# Patient Record
Sex: FEMALE | Race: WHITE | HISPANIC OR LATINO | URBAN - METROPOLITAN AREA
[De-identification: names, ages, dates, MRNs, and addresses within clinical notes are randomized per-mention and may not be internally consistent; named-entity substitution may affect disease eponyms.]

---

## 2022-09-14 ENCOUNTER — EMERGENCY (EMERGENCY)
Facility: HOSPITAL | Age: 22
LOS: 1 days | Discharge: HOME | End: 2022-09-14
Attending: EMERGENCY MEDICINE | Admitting: EMERGENCY MEDICINE
Payer: MEDICAID

## 2022-09-14 VITALS
OXYGEN SATURATION: 97 % | HEART RATE: 64 BPM | RESPIRATION RATE: 18 BRPM | SYSTOLIC BLOOD PRESSURE: 105 MMHG | DIASTOLIC BLOOD PRESSURE: 65 MMHG

## 2022-09-14 VITALS
TEMPERATURE: 98 F | RESPIRATION RATE: 18 BRPM | HEART RATE: 76 BPM | HEIGHT: 65 IN | DIASTOLIC BLOOD PRESSURE: 75 MMHG | OXYGEN SATURATION: 98 % | WEIGHT: 130.07 LBS | SYSTOLIC BLOOD PRESSURE: 119 MMHG

## 2022-09-14 LAB
ALBUMIN SERPL ELPH-MCNC: 4.1 G/DL — SIGNIFICANT CHANGE UP (ref 3.3–5)
ALP SERPL-CCNC: 61 U/L — SIGNIFICANT CHANGE UP (ref 40–120)
ALT FLD-CCNC: 19 U/L — SIGNIFICANT CHANGE UP (ref 10–45)
ANION GAP SERPL CALC-SCNC: 5 MMOL/L — SIGNIFICANT CHANGE UP (ref 5–17)
APPEARANCE UR: CLEAR — SIGNIFICANT CHANGE UP
AST SERPL-CCNC: 19 U/L — SIGNIFICANT CHANGE UP (ref 10–40)
BACTERIA # UR AUTO: ABNORMAL /HPF
BASOPHILS # BLD AUTO: 0.03 K/UL — SIGNIFICANT CHANGE UP (ref 0–0.2)
BASOPHILS NFR BLD AUTO: 0.6 % — SIGNIFICANT CHANGE UP (ref 0–2)
BILIRUB SERPL-MCNC: 0.4 MG/DL — SIGNIFICANT CHANGE UP (ref 0.2–1.2)
BILIRUB UR-MCNC: NEGATIVE — SIGNIFICANT CHANGE UP
BUN SERPL-MCNC: 9 MG/DL — SIGNIFICANT CHANGE UP (ref 7–23)
CALCIUM SERPL-MCNC: 9.1 MG/DL — SIGNIFICANT CHANGE UP (ref 8.4–10.5)
CHLORIDE SERPL-SCNC: 102 MMOL/L — SIGNIFICANT CHANGE UP (ref 96–108)
CO2 SERPL-SCNC: 32 MMOL/L — HIGH (ref 22–31)
COLOR SPEC: YELLOW — SIGNIFICANT CHANGE UP
CREAT SERPL-MCNC: 0.83 MG/DL — SIGNIFICANT CHANGE UP (ref 0.5–1.3)
DIFF PNL FLD: NEGATIVE — SIGNIFICANT CHANGE UP
EGFR: 103 ML/MIN/1.73M2 — SIGNIFICANT CHANGE UP
EOSINOPHIL # BLD AUTO: 0.07 K/UL — SIGNIFICANT CHANGE UP (ref 0–0.5)
EOSINOPHIL NFR BLD AUTO: 1.3 % — SIGNIFICANT CHANGE UP (ref 0–6)
EPI CELLS # UR: ABNORMAL
GLUCOSE SERPL-MCNC: 102 MG/DL — HIGH (ref 70–99)
GLUCOSE UR QL: NEGATIVE — SIGNIFICANT CHANGE UP
HCT VFR BLD CALC: 38.8 % — SIGNIFICANT CHANGE UP (ref 34.5–45)
HGB BLD-MCNC: 12.7 G/DL — SIGNIFICANT CHANGE UP (ref 11.5–15.5)
IMM GRANULOCYTES NFR BLD AUTO: 0.2 % — SIGNIFICANT CHANGE UP (ref 0–1.5)
KETONES UR-MCNC: NEGATIVE — SIGNIFICANT CHANGE UP
LEUKOCYTE ESTERASE UR-ACNC: ABNORMAL
LYMPHOCYTES # BLD AUTO: 2.41 K/UL — SIGNIFICANT CHANGE UP (ref 1–3.3)
LYMPHOCYTES # BLD AUTO: 46.4 % — HIGH (ref 13–44)
MCHC RBC-ENTMCNC: 29.7 PG — SIGNIFICANT CHANGE UP (ref 27–34)
MCHC RBC-ENTMCNC: 32.7 GM/DL — SIGNIFICANT CHANGE UP (ref 32–36)
MCV RBC AUTO: 90.7 FL — SIGNIFICANT CHANGE UP (ref 80–100)
MONOCYTES # BLD AUTO: 0.32 K/UL — SIGNIFICANT CHANGE UP (ref 0–0.9)
MONOCYTES NFR BLD AUTO: 6.2 % — SIGNIFICANT CHANGE UP (ref 2–14)
NEUTROPHILS # BLD AUTO: 2.35 K/UL — SIGNIFICANT CHANGE UP (ref 1.8–7.4)
NEUTROPHILS NFR BLD AUTO: 45.3 % — SIGNIFICANT CHANGE UP (ref 43–77)
NITRITE UR-MCNC: NEGATIVE — SIGNIFICANT CHANGE UP
NRBC # BLD: 0 /100 WBCS — SIGNIFICANT CHANGE UP (ref 0–0)
PH UR: 6.5 — SIGNIFICANT CHANGE UP (ref 5–8)
PLATELET # BLD AUTO: 269 K/UL — SIGNIFICANT CHANGE UP (ref 150–400)
POTASSIUM SERPL-MCNC: 3.8 MMOL/L — SIGNIFICANT CHANGE UP (ref 3.5–5.3)
POTASSIUM SERPL-SCNC: 3.8 MMOL/L — SIGNIFICANT CHANGE UP (ref 3.5–5.3)
PROT SERPL-MCNC: 7.7 G/DL — SIGNIFICANT CHANGE UP (ref 6–8.3)
PROT UR-MCNC: NEGATIVE — SIGNIFICANT CHANGE UP
RBC # BLD: 4.28 M/UL — SIGNIFICANT CHANGE UP (ref 3.8–5.2)
RBC # FLD: 12.4 % — SIGNIFICANT CHANGE UP (ref 10.3–14.5)
RBC CASTS # UR COMP ASSIST: SIGNIFICANT CHANGE UP /HPF (ref 0–4)
SODIUM SERPL-SCNC: 139 MMOL/L — SIGNIFICANT CHANGE UP (ref 135–145)
SP GR SPEC: 1.01 — SIGNIFICANT CHANGE UP (ref 1.01–1.02)
UROBILINOGEN FLD QL: NEGATIVE — SIGNIFICANT CHANGE UP
WBC # BLD: 5.19 K/UL — SIGNIFICANT CHANGE UP (ref 3.8–10.5)
WBC # FLD AUTO: 5.19 K/UL — SIGNIFICANT CHANGE UP (ref 3.8–10.5)
WBC UR QL: SIGNIFICANT CHANGE UP /HPF (ref 0–5)

## 2022-09-14 PROCEDURE — 80053 COMPREHEN METABOLIC PANEL: CPT

## 2022-09-14 PROCEDURE — 81001 URINALYSIS AUTO W/SCOPE: CPT

## 2022-09-14 PROCEDURE — 87086 URINE CULTURE/COLONY COUNT: CPT

## 2022-09-14 PROCEDURE — 99284 EMERGENCY DEPT VISIT MOD MDM: CPT | Mod: 25

## 2022-09-14 PROCEDURE — 96374 THER/PROPH/DIAG INJ IV PUSH: CPT

## 2022-09-14 PROCEDURE — 85025 COMPLETE CBC W/AUTO DIFF WBC: CPT

## 2022-09-14 PROCEDURE — 96361 HYDRATE IV INFUSION ADD-ON: CPT

## 2022-09-14 PROCEDURE — 99284 EMERGENCY DEPT VISIT MOD MDM: CPT

## 2022-09-14 PROCEDURE — 36415 COLL VENOUS BLD VENIPUNCTURE: CPT

## 2022-09-14 RX ORDER — ACETAMINOPHEN 500 MG
650 TABLET ORAL ONCE
Refills: 0 | Status: COMPLETED | OUTPATIENT
Start: 2022-09-14 | End: 2022-09-14

## 2022-09-14 RX ORDER — METOCLOPRAMIDE HCL 10 MG
10 TABLET ORAL ONCE
Refills: 0 | Status: COMPLETED | OUTPATIENT
Start: 2022-09-14 | End: 2022-09-14

## 2022-09-14 RX ORDER — METHOCARBAMOL 500 MG/1
500 TABLET, FILM COATED ORAL ONCE
Refills: 0 | Status: COMPLETED | OUTPATIENT
Start: 2022-09-14 | End: 2022-09-14

## 2022-09-14 RX ORDER — SODIUM CHLORIDE 9 MG/ML
1000 INJECTION INTRAMUSCULAR; INTRAVENOUS; SUBCUTANEOUS ONCE
Refills: 0 | Status: COMPLETED | OUTPATIENT
Start: 2022-09-14 | End: 2022-09-14

## 2022-09-14 RX ADMIN — Medication 10 MILLIGRAM(S): at 14:00

## 2022-09-14 RX ADMIN — METHOCARBAMOL 500 MILLIGRAM(S): 500 TABLET, FILM COATED ORAL at 13:41

## 2022-09-14 RX ADMIN — SODIUM CHLORIDE 1000 MILLILITER(S): 9 INJECTION INTRAMUSCULAR; INTRAVENOUS; SUBCUTANEOUS at 13:41

## 2022-09-14 RX ADMIN — Medication 650 MILLIGRAM(S): at 13:41

## 2022-09-14 NOTE — ED PROVIDER NOTE - PATIENT PORTAL LINK FT
Bladder scan at 365 ml. RN had patient attempt to void multiple times but was not able to void in urinal. Teresa care completed and straight cath preformed with assist of Ana Lilia ALLEN. 350 ml removed, urine red/brown in color with small clots.    You can access the FollowMyHealth Patient Portal offered by [facility] by registering at the following website: [email or web address]. By joining [facility]’s FollowMyHealth portal, you will also be able to view your health information using other applications (apps) compatible with our system.

## 2022-09-14 NOTE — ED ADULT NURSE NOTE - OBJECTIVE STATEMENT
pt reported headache and upper back pain, denies fever, chills cough, or N/V/D skin warm dry color pink. reports 10 lb weight loss over several months. Appetite good. Intermittent bruising.

## 2022-09-14 NOTE — ED PROVIDER NOTE - CLINICAL SUMMARY MEDICAL DECISION MAKING FREE TEXT BOX
23 y/o F with no reported PMH presents to the ED primarily for HA, upper back and neck pain x 15 days. She also c/o intermittent episode sof bruising since age 9, weight loss of 10 lbs x few months and upper back pain x 2 yrs, which worsened yesterday.  She denies trauma. She denies urinary sympts and abd pain despite triage note. Reports FHx of leukemia and cancers. PE as noted above. labs pending, IVF hydration, meds. reassess  pt does not have a PCP, Crystal will help schedule an appt 21 y/o F with no reported PMH presents to the ED primarily for HA, upper back and neck pain x 15 days. She also c/o intermittent episode sof bruising since age 9, weight loss of 10 lbs x few months and upper back pain x 2 yrs, which worsened yesterday.  She denies trauma. She denies urinary sympts and abd pain despite triage note. Reports FHx of leukemia and cancers. PE as noted above. labs pending, IVF hydration, meds. reassess  pt does not have a PCP, Crystal will help schedule an appt  No acute findings. Worsening, continued or ANY new concerning symptoms return to the emergency department.

## 2022-09-14 NOTE — ED PROVIDER NOTE - ATTENDING APP SHARED VISIT CONTRIBUTION OF CARE
Kailee with CHARLIE Hess. 23 y/o F with no reported PMH presents to the ED primarily for HA, upper back and neck pain x 15 days. She also c/o intermittent episodes of bruising since age 9, weight loss of 10 lbs x few months and upper back pain x 2 yrs, which worsened yesterday.  She denies trauma. She denies urinary sympts and abd pain despite triage note. Reports FHx of leukemia and cancers. PE as noted above. labs pending, IVF hydration, meds. reassess  pt does not have a PCP, Crystal will help schedule an appt  No acute findings. Worsening, continued or ANY new concerning symptoms return to the emergency department.    I performed a face to face bedside interview with patient regarding history of present illness, review of symptoms and past medical history. I completed an independent physical exam.  I have discussed the patient's plan of care with Physician Assistant (PA). I agree with note as stated above, having amended the EMR as needed to reflect my findings.   This includes History of Present Illness, HIV, Past Medical/Surgical/Family/Social History, Allergies and Home Medications, Review of Systems, Physical Exam, and any Progress Notes during the time I functioned as the attending physician for this patient.

## 2022-09-14 NOTE — ED PROVIDER NOTE - PHYSICAL EXAMINATION
Gen: Well appearing in NAD.  AAOx3  Eyes: PERRLA  Head: atraumatic  Heart: s1/s2, RRR  Lung: CTA b/l, no wheezing/rhonchi or rales.   Abd: soft, NT/ND, no rebound or guarding, NCVAT  Extremity: +reproducible para-cervical muscle TTP and trapezius muscle TTP.  no midline spinal TTP   Neuro: patient moving all extremity equally, no focal neuro deficits noted  Skin: Normal for race. No rashes    pt c/o right axilla lump. Breast exam chaperoned by SANDY Cortes: no palpable breast lumps or axillary lymphadenopathy. No nipple dc

## 2022-09-14 NOTE — ED PROVIDER NOTE - NSFOLLOWUPINSTRUCTIONS_ED_ALL_ED_FT
Migraña    LO QUE NECESITA SABER:    Mary migraña es un dolor de carl intenso. El dolor puede ser tan severo que interfiere con juliette actividades cotidianas. Mary migraña puede durar desde pocas horas hasta varios días. La causa exacta de la migraña no es conocida.    INSTRUCCIONES SOBRE EL [facility] HOSPITALARIA:    Llame al número local de emergencias (911 en los [place]) o pídale a alguien que llame si:  •Usted siente que se va a desmayar, se siente confundido o sufre mary convulsión.          Regrese a la kelli de emergencias si:  •Usted tiene dolor de carl que parece ser diferente o mucho peor que terrazas migraña habitual.      •Usted tiene un dolor de carl severo con fiebre o rigidez en el zeynep.      •Usted tiene nuevos problemas con el habla, la visión, el equilibrio o el movimiento.      Llame a terrazas médico o neurólogo si:  •Terrazas migraña interfiere con juliette actividades cotidianas.      •Juliette medicamentos o tratamientos alvino de funcionar.      •Usted tiene preguntas o inquietudes acerca de terrazas condición o cuidado.      Medicamentos:Algunos medicamentos pueden administrarse solamente mientras está en el servicio de urgencias. También es posible que más adelante necesite medicamentos para controlar las migrañas u otros problemas de minal que puedan causar. [place] el medicamento tan pronto pantera sienta que le comienza mary migraña, o según le hayan indicado.  •Medicamentos para la migrañase utilizan para ayudar a evitar o detener mary migraña.      •AINEpueden disminuir la inflamación y el dolor o la fiebre. Mara medicamento está disponible con o sin mary receta médica. Los CHERRY pueden causar sangrado estomacal o problemas renales en ciertas personas. Si usted juanjose un medicamento anticoagulante, siempre pregúntele a terrazas médico si los CHERRY son seguros para usted. Siempre pasha la etiqueta de mara medicamento y siga las instrucciones.      •Acetaminofénalivia el dolor y baja la fiebre. Está disponible sin receta médica. Pregunte la cantidad y la frecuencia con que debe tomarlos. Siga las indicaciones. Pasha las etiquetas de todos los demás medicamentos que esté usando para saber si también contienen acetaminofén, o pregunte a terrazas médico o farmacéutico. El acetaminofén puede causar daño en el hígado cuando no se juanjose de forma correcta.      •Puede administrarsepodrían administrarse. Pregunte al médico cómo debe shaan mara medicamento de forma oates. Algunos medicamentos recetados para el dolor contienen acetaminofén. No tome otros medicamentos que contengan acetaminofén sin consultarlo con terrazas médico. Demasiado acetaminofeno puede causar daño al hígado. Los medicamentos recetados para el dolor podrían causar estreñimiento. Pregunte a terrazas médico pantera prevenir o tratar estreñimiento.      •Los medicamentos contra las náuseaspueden darse para calmar terrazas estómago y ayudarle a prevenir los vómitos. Mara medicamento también puede aliviar el dolor.      •Los esteroidespueden administrarse para evitar que la migraña vuelva a aparecer de inmediato.      •[place] juliette medicamentos pantera se le haya indicado.Consulte con terrazas médico si usted zuleyma que terrazas medicamento no le está ayudando o si presenta efectos secundarios. Infórmele al médico si usted es alérgico a algún medicamento. Mantenga mary lista actualizada de los medicamentos, las vitaminas y los productos herbales que juanjose. Incluya los siguientes datos de los medicamentos: cantidad, frecuencia y motivo de administración. Traiga con usted la lista o los envases de las píldoras a juliette citas de seguimiento. Lleve la lista de los medicamentos con usted en luis fernando de mary emergencia.      El manejo de juliette síntomas:  •Repose en mary habitación oscura y tranquila.[place] ayudará a disminuir el dolor. El dormir también podría ayudarlo a aliviar terrazas dolor.      •Aplique hielo para reducir el dolor.Use mary compresa de hielo o ponga hielo triturado en mary bolsa de plástico. Cubra el paquete de hielo con mary toalla y colóqueselo en la carl. Aplique hielo michele 15 a 20 minutos cada hora.      •Aplique calor para disminuir el dolor y los espasmos musculares.Utilice mary toalla pequeña empapada con [place], mary almohada térmica o tome un baño de nico con agua tibia. Aplique la compresa caliente sobre el área por 20 a 30 minutos cada 2 horas. Usted puede alternar el calor y el hielo.      •Mantenga un registro de las migrañas.Escriba cuándo comienzan y terminan juliette migrañas. Incluya juliette síntomas y qué estaba haciendo cuando comenzó mary migraña. Registre lo que comió y lo que tomó las 24 horas antes de que comenzara terrazas migraña. Mantenga un registro de lo que hizo para tratar terrazas migraña y si funcionó. Traiga el registro de las migrañas con usted a las citas con terrazas médico.      Los factores desencadenantes comunes de la migraña incluyen los siguientes:  •El estrés, fatiga de los ojos, dormir demasiado o no dormir lo suficiente      •Cambios hormonales en las mujeres debido a las píldoras anticonceptivas, embarazo, menopausia o michele la menstruación      •Omitir comidas, pasar mucho tiempo sin comer o no shaan suficientes líquidos      •Ciertos alimentos o bebidas, pantera el chocolate, queso gurwinder, alcohol o bebidas que contienen cafeína      •Alimentos que contienen gluten, nitratos, glutamato monosódico o endulzantes artificiales      •Charis solar, luces brillantes o luces parpadeantes, ruidos tosin, humo u olores tosin      •Calor, humedad o cambios en el clima      Evite otra migraña:  •Evite el dolor de carl por uso excesivo de medicamentos.[place] los analgésicos solamente según le hayan indicado. Un medicamento puede estar limitado a mary cierta cantidad cada mes. El médico puede ayudarlo a crear un plan para que reciba mary cantidad oates cada mes.      •No fume.La nicotina y otras sustancias químicas en los cigarrillos y puros pueden desencadenar mary migraña o agravarla. Pida información a terrazas médico si usted actualmente fuma y necesita ayuda para dejar de fumar. Los cigarrillos electrónicos o el tabaco sin humo igualmente contienen nicotina. Consulte con terrazas médico antes de utilizar estos productos.      •No consuma alcohol.El alcohol puede provocar migraña. También puede impedir que tengan efecto los medicamentos para la migraña.      •Sea físicamente activo.La actividad física, pantera el ejercicio, puede ayudar a prevenir las migrañas. Consulte con terrazas médico acerca del mejor plan de actividad para usted. Trate de hacer al menos 30 minutos de actividad física la mayoría de los jailene.  Rogelio hispana caminando pantera ejercicio           •Controle el estrés.El estrés podría provocar migraña. Aprenda nuevas maneras de relajarse pantera la respiración profunda.      •Establezca un horario para dormir.Acuéstese y levántese a la misma hora cada día. No robel televisión inmediatamente antes de acostarse.      •Consuma alimentos saludables y variados.Incluya alimentos saludables pantera fruta, verduras, panes integrales, productos lácteos bajos en grasa, frijoles, carne magra y pescado. No consuma alimentos o bebidas que puedan desencadenar juliette migrañas.  Alimentos saludables           •[place] más líquidos para evitar la deshidratación.Terrazas médico le indicará cuánto líquido debería beber a diario y qué líquidos son los mejores para usted.      Acuda a juliette consultas de control con terrazas médico o neurólogo según le indicaron:Lleve terrazas registro de migrañas con usted. Anote juliette preguntas para que se acuerde de hacerlas michele juliette visitas.

## 2022-09-14 NOTE — ED PROVIDER NOTE - NS ED ATTENDING STATEMENT MOD
This was a shared visit with the JUAQUIN. I reviewed and verified the documentation and independently performed the documented:

## 2022-09-14 NOTE — ED PROVIDER NOTE - OBJECTIVE STATEMENT
# [id number]  23 y/o F with no reported PMH presents to the ED primarily for HA, upper back and neck pain x 15 days. She also c/o intermittent episode sof bruising since age 9, weight loss of 10 lbs x few months and upper back pain x 2 yrs, which worsened yesterday.  She denies trauma. She denies urinary sympts and abd pain despite triage note. Reports FHx of leukemia and cancers.

## 2022-09-15 PROBLEM — Z00.00 ENCOUNTER FOR PREVENTIVE HEALTH EXAMINATION: Status: ACTIVE | Noted: 2022-09-15

## 2022-09-16 LAB
CULTURE RESULTS: SIGNIFICANT CHANGE UP
SPECIMEN SOURCE: SIGNIFICANT CHANGE UP

## 2022-09-19 ENCOUNTER — OUTPATIENT (OUTPATIENT)
Dept: OUTPATIENT SERVICES | Facility: HOSPITAL | Age: 22
LOS: 1 days | End: 2022-09-19
Payer: SELF-PAY

## 2022-09-19 ENCOUNTER — RESULT REVIEW (OUTPATIENT)
Age: 22
End: 2022-09-19

## 2022-09-19 ENCOUNTER — RESULT CHARGE (OUTPATIENT)
Age: 22
End: 2022-09-19

## 2022-09-19 ENCOUNTER — APPOINTMENT (OUTPATIENT)
Dept: FAMILY MEDICINE | Facility: HOSPITAL | Age: 22
End: 2022-09-19

## 2022-09-19 VITALS
OXYGEN SATURATION: 98 % | HEART RATE: 74 BPM | BODY MASS INDEX: 23.19 KG/M2 | TEMPERATURE: 98.4 F | SYSTOLIC BLOOD PRESSURE: 115 MMHG | RESPIRATION RATE: 12 BRPM | WEIGHT: 126 LBS | HEIGHT: 62 IN | DIASTOLIC BLOOD PRESSURE: 69 MMHG

## 2022-09-19 DIAGNOSIS — Z00.00 ENCOUNTER FOR GENERAL ADULT MEDICAL EXAMINATION WITHOUT ABNORMAL FINDINGS: ICD-10-CM

## 2022-09-19 DIAGNOSIS — Z23 ENCOUNTER FOR IMMUNIZATION: ICD-10-CM

## 2022-09-19 PROBLEM — Z78.9 OTHER SPECIFIED HEALTH STATUS: Chronic | Status: ACTIVE | Noted: 2022-09-14

## 2022-09-19 PROCEDURE — G0463: CPT

## 2022-09-20 DIAGNOSIS — N92.0 EXCESSIVE AND FREQUENT MENSTRUATION WITH REGULAR CYCLE: ICD-10-CM

## 2022-09-20 PROBLEM — Z23 ENCOUNTER FOR IMMUNIZATION: Status: ACTIVE | Noted: 2022-09-19

## 2022-09-20 LAB
BILIRUB UR QL STRIP: NEGATIVE
CLARITY UR: CLEAR
COLLECTION METHOD: NORMAL
GLUCOSE UR-MCNC: NEGATIVE
HCG UR QL: 0.2 EU/DL
HCG UR QL: NEGATIVE
HGB UR QL STRIP.AUTO: NEGATIVE
KETONES UR-MCNC: NEGATIVE
LEUKOCYTE ESTERASE UR QL STRIP: NORMAL
NITRITE UR QL STRIP: NEGATIVE
PH UR STRIP: 8
PROT UR STRIP-MCNC: NEGATIVE
QUALITY CONTROL: YES
SP GR UR STRIP: 1.01

## 2022-09-23 NOTE — HEALTH RISK ASSESSMENT
[Never] : Never [No] : No [0] : 2) Feeling down, depressed, or hopeless: Not at all (0) [With Family] : lives with family [Unemployed] : unemployed [College] : College [Single] : single [Sexually Active] : sexually active [Smoke Detector] : smoke detector [Reports changes in hearing] : Reports no changes in hearing [Reports changes in vision] : Reports no changes in vision

## 2022-09-23 NOTE — HISTORY OF PRESENT ILLNESS
[Post-hospitalization from ___ Hospital] : Post-hospitalization from [unfilled] Hospital [FreeTextEntry2] : 21 YO F with no past pertinent Medical history presents to establish care. Patient states that she has abdominal pain, and brought from ED, and was evaluated, and was discharged. She has had this for 2 months. LMP 6/13/22. Patient had a Depo Provera shot given in May 2022, administered in [facility] previous to coming to this country. Patient since then has had cyclic abdominal and pelvic pain, no menstrual bleeding, and has reported to the emergency department. She was given IV Fluids and NSAIDs. She has no current plans to become pregnant but states that she could be

## 2022-09-26 ENCOUNTER — RESULT REVIEW (OUTPATIENT)
Age: 22
End: 2022-09-26

## 2022-09-26 ENCOUNTER — OUTPATIENT (OUTPATIENT)
Dept: OUTPATIENT SERVICES | Facility: HOSPITAL | Age: 22
LOS: 1 days | End: 2022-09-26
Payer: SELF-PAY

## 2022-09-26 DIAGNOSIS — Z00.00 ENCOUNTER FOR GENERAL ADULT MEDICAL EXAMINATION WITHOUT ABNORMAL FINDINGS: ICD-10-CM

## 2022-09-26 PROCEDURE — 76856 US EXAM PELVIC COMPLETE: CPT

## 2022-09-26 PROCEDURE — 76830 TRANSVAGINAL US NON-OB: CPT | Mod: 26

## 2022-09-26 PROCEDURE — 76856 US EXAM PELVIC COMPLETE: CPT | Mod: 26

## 2022-09-26 PROCEDURE — 76830 TRANSVAGINAL US NON-OB: CPT

## 2022-10-29 ENCOUNTER — APPOINTMENT (OUTPATIENT)
Dept: FAMILY MEDICINE | Facility: HOSPITAL | Age: 22
End: 2022-10-29

## 2022-10-29 ENCOUNTER — OUTPATIENT (OUTPATIENT)
Dept: OUTPATIENT SERVICES | Facility: HOSPITAL | Age: 22
LOS: 1 days | End: 2022-10-29
Payer: SELF-PAY

## 2022-10-29 VITALS
BODY MASS INDEX: 23.55 KG/M2 | TEMPERATURE: 97.3 F | OXYGEN SATURATION: 98 % | HEART RATE: 73 BPM | HEIGHT: 62 IN | SYSTOLIC BLOOD PRESSURE: 109 MMHG | RESPIRATION RATE: 14 BRPM | WEIGHT: 128 LBS | DIASTOLIC BLOOD PRESSURE: 66 MMHG

## 2022-10-29 DIAGNOSIS — N92.0 EXCESSIVE AND FREQUENT MENSTRUATION WITH REGULAR CYCLE: ICD-10-CM

## 2022-10-29 DIAGNOSIS — Z00.00 ENCOUNTER FOR GENERAL ADULT MEDICAL EXAMINATION WITHOUT ABNORMAL FINDINGS: ICD-10-CM

## 2022-10-29 PROCEDURE — G0463: CPT

## 2022-10-29 RX ORDER — LEVONORGESTREL AND ETHINYL ESTRADIOL 0.15-0.03
0.15-3 KIT ORAL DAILY
Qty: 3 | Refills: 3 | Status: ACTIVE | COMMUNITY
Start: 2022-10-29 | End: 1900-01-01

## 2022-10-31 DIAGNOSIS — N92.0 EXCESSIVE AND FREQUENT MENSTRUATION WITH REGULAR CYCLE: ICD-10-CM

## 2022-12-04 NOTE — PHYSICAL EXAM
[Normal Appearance] : normal in appearance [No Masses] : no palpable masses [No Nipple Discharge] : no nipple discharge [No Axillary Lymphadenopathy] : no axillary lymphadenopathy [Normal] : no joint swelling and grossly normal strength and tone [de-identified] : Chaperone - Melissa Mays

## 2022-12-04 NOTE — HISTORY OF PRESENT ILLNESS
[de-identified] : 23 YO F presents for a follow up of irregular menstrual period. Patient was given Depo Provera prior to immigrating to the [place]. Patient happy that her LMP occurred on 10/5/22. She states that it was assciated with heavy bleeding and pelvic pain during menstration, as well as right sided breast tenderness, with no masses. Jarretttent is sexually active with one male person and uses condoms for contraception. Patient denies any fever, chills, chest pain, shortness of breath, nausea, vomiting, headache, cough, leg pain dizziness or weakness. \par \par  \par

## 2022-12-04 NOTE — ASSESSMENT
[FreeTextEntry1] : Patient to return to clinic in 1 month for follow up of breast tenderness and pelvic pain\par

## 2022-12-12 ENCOUNTER — APPOINTMENT (OUTPATIENT)
Dept: FAMILY MEDICINE | Facility: HOSPITAL | Age: 22
End: 2022-12-12

## 2022-12-12 DIAGNOSIS — N64.4 MASTODYNIA: ICD-10-CM

## 2022-12-12 DIAGNOSIS — R10.2 PELVIC AND PERINEAL PAIN: ICD-10-CM

## 2023-06-29 ENCOUNTER — RESULT CHARGE (OUTPATIENT)
Age: 23
End: 2023-06-29

## 2023-06-29 ENCOUNTER — APPOINTMENT (OUTPATIENT)
Dept: FAMILY MEDICINE | Facility: HOSPITAL | Age: 23
End: 2023-06-29

## 2023-06-29 ENCOUNTER — OUTPATIENT (OUTPATIENT)
Dept: OUTPATIENT SERVICES | Facility: HOSPITAL | Age: 23
LOS: 1 days | End: 2023-06-29
Payer: SELF-PAY

## 2023-06-29 VITALS
DIASTOLIC BLOOD PRESSURE: 72 MMHG | TEMPERATURE: 98.1 F | HEART RATE: 82 BPM | OXYGEN SATURATION: 99 % | SYSTOLIC BLOOD PRESSURE: 116 MMHG | HEIGHT: 62 IN | RESPIRATION RATE: 14 BRPM

## 2023-06-29 DIAGNOSIS — Z00.00 ENCOUNTER FOR GENERAL ADULT MEDICAL EXAMINATION WITHOUT ABNORMAL FINDINGS: ICD-10-CM

## 2023-06-29 PROCEDURE — 87186 SC STD MICRODIL/AGAR DIL: CPT

## 2023-06-29 PROCEDURE — 87086 URINE CULTURE/COLONY COUNT: CPT

## 2023-06-29 PROCEDURE — G0463: CPT

## 2023-06-29 PROCEDURE — 87077 CULTURE AEROBIC IDENTIFY: CPT

## 2023-06-29 PROCEDURE — 87255 GENET VIRUS ISOLATE HSV: CPT

## 2023-06-29 PROCEDURE — 87800 DETECT AGNT MULT DNA DIREC: CPT

## 2023-06-29 PROCEDURE — 87070 CULTURE OTHR SPECIMN AEROBIC: CPT

## 2023-06-29 PROCEDURE — 36415 COLL VENOUS BLD VENIPUNCTURE: CPT

## 2023-06-29 RX ORDER — VALACYCLOVIR 500 MG/1
500 TABLET, FILM COATED ORAL TWICE DAILY
Qty: 14 | Refills: 0 | Status: ACTIVE | COMMUNITY
Start: 2023-06-29 | End: 1900-01-01

## 2023-06-30 DIAGNOSIS — R21 RASH AND OTHER NONSPECIFIC SKIN ERUPTION: ICD-10-CM

## 2023-07-01 LAB
BACTERIA UR CULT: NORMAL
BILIRUB UR QL STRIP: NORMAL
C TRACH RRNA SPEC QL NAA+PROBE: NOT DETECTED
CANDIDA VAG CYTO: NOT DETECTED
CLARITY UR: CLEAR
COLLECTION METHOD: NORMAL
G VAGINALIS+PREV SP MTYP VAG QL MICRO: DETECTED
GLUCOSE UR-MCNC: NORMAL
HCG UR QL: 0.2 EU/DL
HGB UR QL STRIP.AUTO: NORMAL
KETONES UR-MCNC: NORMAL
LEUKOCYTE ESTERASE UR QL STRIP: NORMAL
N GONORRHOEA RRNA SPEC QL NAA+PROBE: NOT DETECTED
NITRITE UR QL STRIP: NORMAL
PH UR STRIP: >=9
PROT UR STRIP-MCNC: NORMAL
SOURCE AMPLIFICATION: NORMAL
SP GR UR STRIP: 1.01
T VAGINALIS VAG QL WET PREP: NOT DETECTED

## 2023-07-01 RX ORDER — METRONIDAZOLE 7.5 MG/G
0.75 GEL VAGINAL
Qty: 1 | Refills: 0 | Status: ACTIVE | COMMUNITY
Start: 2023-07-01 | End: 1900-01-01

## 2023-07-03 LAB
HHV SPEC CULT: NORMAL
HSV TYPE 1: NORMAL
HSV TYPE 2: NORMAL

## 2023-07-04 ENCOUNTER — NON-APPOINTMENT (OUTPATIENT)
Age: 23
End: 2023-07-04

## 2023-07-04 NOTE — PHYSICAL EXAM
[Normal] : soft, non-tender, non-distended, no masses palpated, no HSM and normal bowel sounds [de-identified] : diffuse erythema on genitals and surrounding area with satellite lesions to groin and pubic symphysis.

## 2023-07-04 NOTE — HISTORY OF PRESENT ILLNESS
[FreeTextEntry8] : 21 YO F who presents for concern of genital  rash x1week. Rash is painful and itchy. States irritation when urinating. No previous episodes. Is sexually active and uses condoms.\par \par

## 2023-07-04 NOTE — REVIEW OF SYSTEMS
[Dysuria] : dysuria [Negative] : Eyes [Fever] : no fever [Chills] : no chills [Chest Pain] : no chest pain [Palpitations] : no palpitations [Shortness Of Breath] : no shortness of breath [Wheezing] : no wheezing [Cough] : no cough [Abdominal Pain] : no abdominal pain [Nausea] : no nausea [Vomiting] : no vomiting [Hematuria] : no hematuria [Frequency] : no frequency [Headache] : no headache [Dizziness] : no dizziness [FreeTextEntry8] : pruritic rash

## 2023-07-06 ENCOUNTER — APPOINTMENT (OUTPATIENT)
Dept: FAMILY MEDICINE | Facility: HOSPITAL | Age: 23
End: 2023-07-06

## 2023-07-06 ENCOUNTER — OUTPATIENT (OUTPATIENT)
Dept: OUTPATIENT SERVICES | Facility: HOSPITAL | Age: 23
LOS: 1 days | End: 2023-07-06
Payer: COMMERCIAL

## 2023-07-06 VITALS
DIASTOLIC BLOOD PRESSURE: 61 MMHG | SYSTOLIC BLOOD PRESSURE: 106 MMHG | RESPIRATION RATE: 16 BRPM | OXYGEN SATURATION: 96 % | HEART RATE: 67 BPM | WEIGHT: 122 LBS | TEMPERATURE: 97.4 F

## 2023-07-06 DIAGNOSIS — N76.0 ACUTE VAGINITIS: ICD-10-CM

## 2023-07-06 DIAGNOSIS — R21 RASH AND OTHER NONSPECIFIC SKIN ERUPTION: ICD-10-CM

## 2023-07-06 DIAGNOSIS — B96.89 ACUTE VAGINITIS: ICD-10-CM

## 2023-07-06 DIAGNOSIS — Z00.00 ENCOUNTER FOR GENERAL ADULT MEDICAL EXAMINATION WITHOUT ABNORMAL FINDINGS: ICD-10-CM

## 2023-07-06 PROCEDURE — G0463: CPT

## 2023-07-06 RX ORDER — AMOXICILLIN 500 MG/1
500 TABLET, FILM COATED ORAL 3 TIMES DAILY
Qty: 15 | Refills: 0 | Status: ACTIVE | COMMUNITY
Start: 2023-07-06 | End: 1900-01-01

## 2023-07-06 RX ORDER — CLOTRIMAZOLE AND BETAMETHASONE DIPROPIONATE 10; .5 MG/G; MG/G
1-0.05 CREAM TOPICAL TWICE DAILY
Qty: 1 | Refills: 0 | Status: ACTIVE | COMMUNITY
Start: 2023-06-29 | End: 1900-01-01

## 2023-07-06 NOTE — REVIEW OF SYSTEMS
[Fever] : no fever [Chills] : no chills [Chest Pain] : no chest pain [Palpitations] : no palpitations [Shortness Of Breath] : no shortness of breath [Wheezing] : no wheezing [Abdominal Pain] : no abdominal pain [Nausea] : no nausea [Vomiting] : no vomiting [Dysuria] : no dysuria [Frequency] : no frequency [Vaginal Discharge] : no vaginal discharge [Dysmenorrhea] : no dysmenorrhea

## 2023-07-06 NOTE — PHYSICAL EXAM
[No Acute Distress] : no acute distress [Well Nourished] : well nourished [Normal Sclera/Conjunctiva] : normal sclera/conjunctiva [EOMI] : extraocular movements intact [No Respiratory Distress] : no respiratory distress  [No Accessory Muscle Use] : no accessory muscle use [Clear to Auscultation] : lungs were clear to auscultation bilaterally [Normal Rate] : normal rate  [Regular Rhythm] : with a regular rhythm [Normal S1, S2] : normal S1 and S2 [de-identified] : no longer erythematous genitalia; a few satellite lesions to groin and pubic symphysis

## 2023-07-06 NOTE — HISTORY OF PRESENT ILLNESS
[FreeTextEntry1] : f/u genital rash  [de-identified] : - Patient is a 24 y/o F presenting to clinic to follow-up on genital rash. \par - She was last seen on 6/29/23 for a genital rash that had been present for 1 week at that time. Was painful and itchy and she experienced stinging when urinating. \par - She was empirically given clotrimazole cream and 7 days of valacyclovir. BV panel and  Genital culture was also performed. \par - BV panel from 6/29/23 showed bacterial vaginosis. Patient was called and metronidazole gel was sent. \par - Today she reports significant improvement in the rash. No longer erythematous in her inner thighs. There are still a few scattered satellite lesions on her mons pubis.

## 2023-07-08 LAB — BACTERIA GENITAL AEROBE CULT: ABNORMAL

## 2023-07-11 DIAGNOSIS — R21 RASH AND OTHER NONSPECIFIC SKIN ERUPTION: ICD-10-CM

## 2023-07-30 ENCOUNTER — EMERGENCY (EMERGENCY)
Facility: HOSPITAL | Age: 23
LOS: 1 days | Discharge: HOME | End: 2023-07-30
Attending: STUDENT IN AN ORGANIZED HEALTH CARE EDUCATION/TRAINING PROGRAM | Admitting: STUDENT IN AN ORGANIZED HEALTH CARE EDUCATION/TRAINING PROGRAM
Payer: COMMERCIAL

## 2023-07-30 VITALS
OXYGEN SATURATION: 98 % | HEART RATE: 85 BPM | WEIGHT: 121.92 LBS | RESPIRATION RATE: 18 BRPM | DIASTOLIC BLOOD PRESSURE: 88 MMHG | HEIGHT: 61.81 IN | TEMPERATURE: 98 F | SYSTOLIC BLOOD PRESSURE: 120 MMHG

## 2023-07-30 PROCEDURE — 99283 EMERGENCY DEPT VISIT LOW MDM: CPT

## 2023-07-30 PROCEDURE — 99282 EMERGENCY DEPT VISIT SF MDM: CPT

## 2023-07-30 NOTE — ED ADULT TRIAGE NOTE - CHIEF COMPLAINT QUOTE
c/o right lower extremity pain for a few days. Pt. states she has bruising to right inner side of knee. Denies trauma or recent injury. Pt. reports a h/o bruising randomly throughout her lifetime. Pt. unsure if she has h/o anemia or low iron levels.

## 2023-07-31 NOTE — ED PROVIDER NOTE - CLINICAL SUMMARY MEDICAL DECISION MAKING FREE TEXT BOX
Patient presents to the ER with an area of bruising over the right lower leg.  There is no trauma, falls, any injuries reported there is no history of abnormal bruising.  Otherwise well-appearing.  Has been ambulatory without limping.  I suspect this is likely a small contusion that occurred.  Given the patient's ambulatory with no loss of range of motion will defer imaging at this time.  Patient can be discharged home

## 2023-07-31 NOTE — ED PROVIDER NOTE - PROGRESS NOTE DETAILS
[facility] Attending    I explained to the patient that given that there is no trauma there is low suspicion for fracture patient has been ambulatory since having the symptoms.  Has not been limping.  I suspect this is mild bruise.  Therefore will defer imaging at this time and agreement with the patient. 46.8

## 2023-07-31 NOTE — ED ADULT NURSE NOTE - PAIN: BODY LOCATION
Patient is going to be taking an OTC iron supplement and wants to know what dosage amount Dr Aby Booker wants her to take. Please call patient to advise. right knee

## 2023-07-31 NOTE — ED PROVIDER NOTE - PHYSICAL EXAMINATION
Vital signs reviewed  GENERAL: Patient nontoxic appearing, NAD  HEAD: NCAT  EYES: Anicteric  ENT: MMM  NECK: Supple, non tender  RESPIRATORY: Normal respiratory effort. CTA B/L. No wheezing, rales, rhonchi  CARDIOVASCULAR: Regular rate and rhythm  ABDOMEN: Soft. Nondistended. Nontender. No guarding or rebound. No CVA tenderness.  MUSCULOSKELETAL/EXTREMITIES: Brisk cap refill. 2+ radial pulses. No leg edema.    2 x 2 area of ecchymosis to the  lateral aspect of the right femur.  There is no crepitus, no erythema. +tenderness.   DP pulse 2+ sensation intact  KNEE FROM.   SKIN:  Warm and dry  NEURO: AAOx3. No gross FND.  PSYCHIATRIC: Cooperative. Affect appropriate.

## 2023-07-31 NOTE — ED PROVIDER NOTE - PATIENT PORTAL LINK FT
You can access the FollowMyHealth Patient Portal offered by [facility] by registering at the following website: [email or web address]. By joining [employer]’s FollowMyHealth portal, you will also be able to view your health information using other applications (apps) compatible with our system.

## 2023-07-31 NOTE — ED ADULT NURSE NOTE - OBJECTIVE STATEMENT
Presents to ED from home c/o right lower extremity pain for a few days. Pt. states she has bruising to right inner side of knee. Denies trauma or recent injury. Pt. reports a h/o bruising randomly throughout her lifetime. Pt. unsure if she has h/o anemia or low iron levels.

## 2023-07-31 NOTE — ED PROVIDER NOTE - NSFOLLOWUPINSTRUCTIONS_ED_ALL_ED_FT
Contusion  A contusion is a deep bruise. Contusions are the result of a blunt injury to tissues and muscle fibers under the skin. The injury causes bleeding under the skin. The skin overlying the contusion may turn blue, purple, or yellow. Minor injuries will give you a painless contusion, but more severe injuries cause contusions that may stay painful and swollen for a few weeks.    Follow these instructions at home:  Pay attention to any changes in your symptoms. Let your health care provider know about them. Take these actions to relieve your pain.    Managing pain, stiffness, and swelling    Bag of ice on a towel on the skin.  Use resting, icing, applying pressure (compression), and raising (elevating) the injured area. This is often called the RICE strategy.  Rest the injured area. Return to your normal activities as told by your health care provider. Ask your health care provider what activities are safe for you.  If directed, put ice on the injured area:  Put ice in a plastic bag.  Place a towel between your skin and the bag.  Leave the ice on for 20 minutes, 2–3 times per day.  If directed, apply light compression to the injured area using an elastic bandage. Make sure the bandage is not wrapped too tightly. Remove and reapply the bandage as directed by your health care provider.  If possible, raise (elevate) the injured area above the level of your heart while you are sitting or lying down.  General instructions    Take over-the-counter and prescription medicines only as told by your health care provider.  Keep all follow-up visits as told by your health care provider. This is important.  Contact a health care provider if:  Your symptoms do not improve after several days of treatment.  Your symptoms get worse.  You have difficulty moving the injured area.  Get help right away if:  You have severe pain.  You have numbness in a hand or foot.  Your hand or foot turns pale or cold.  Summary  A contusion is a deep bruise.  Contusions are the result of a blunt injury to tissues and muscle fibers under the skin.  It is treated with rest, ice, compression, and elevation. You may be given over-the-counter medicines for pain.  Contact a health care provider if your symptoms do not improve, or get worse.  Get help right away if you have severe pain, have numbness, or the area turns pale or cold.  This information is not intended to replace advice given to you by your health care provider. Make sure you discuss any questions you have with your health care provider.

## 2023-07-31 NOTE — ED PROVIDER NOTE - OBJECTIVE STATEMENT
22-year-old female presents to the ER for abnormal bruising.  Patient accompanied by significant other who is willing to translate for the patient states that over the past few days patient has an area of bruising and pain to the right upper leg.    states that the bruising has been increasing over size.    Unsure as to how started denies any history of trauma, falls, numbness, fever or history of abnormal bruising.